# Patient Record
Sex: MALE | Race: WHITE | NOT HISPANIC OR LATINO | Employment: UNEMPLOYED | URBAN - METROPOLITAN AREA
[De-identification: names, ages, dates, MRNs, and addresses within clinical notes are randomized per-mention and may not be internally consistent; named-entity substitution may affect disease eponyms.]

---

## 2021-03-08 ENCOUNTER — HOSPITAL ENCOUNTER (EMERGENCY)
Facility: HOSPITAL | Age: 51
Discharge: HOME | End: 2021-03-08
Attending: FAMILY MEDICINE

## 2021-03-08 VITALS
HEART RATE: 110 BPM | RESPIRATION RATE: 18 BRPM | TEMPERATURE: 99 F | HEIGHT: 69 IN | SYSTOLIC BLOOD PRESSURE: 146 MMHG | BODY MASS INDEX: 26.66 KG/M2 | WEIGHT: 180 LBS | OXYGEN SATURATION: 98 % | DIASTOLIC BLOOD PRESSURE: 100 MMHG

## 2021-03-08 DIAGNOSIS — L03.90 CELLULITIS, UNSPECIFIED CELLULITIS SITE: Primary | ICD-10-CM

## 2021-03-08 PROCEDURE — 99283 EMERGENCY DEPT VISIT LOW MDM: CPT

## 2021-03-08 PROCEDURE — 25000003 PHARM REV CODE 250: Performed by: FAMILY MEDICINE

## 2021-03-08 RX ORDER — SULFAMETHOXAZOLE AND TRIMETHOPRIM 800; 160 MG/1; MG/1
1 TABLET ORAL 2 TIMES DAILY
Qty: 20 TABLET | Refills: 0 | Status: SHIPPED | OUTPATIENT
Start: 2021-03-08 | End: 2021-03-18

## 2021-03-08 RX ORDER — SULFAMETHOXAZOLE AND TRIMETHOPRIM 800; 160 MG/1; MG/1
2 TABLET ORAL 2 TIMES DAILY
Status: DISCONTINUED | OUTPATIENT
Start: 2021-03-08 | End: 2021-03-08 | Stop reason: HOSPADM

## 2021-03-08 RX ORDER — SULFAMETHOXAZOLE AND TRIMETHOPRIM 800; 160 MG/1; MG/1
1 TABLET ORAL 2 TIMES DAILY
Qty: 20 TABLET | Refills: 0 | Status: SHIPPED | OUTPATIENT
Start: 2021-03-08 | End: 2021-03-08 | Stop reason: SDUPTHER

## 2021-03-08 RX ADMIN — SULFAMETHOXAZOLE AND TRIMETHOPRIM 2 TABLET: 800; 160 TABLET ORAL at 08:03

## 2024-06-11 ENCOUNTER — HOSPITAL ENCOUNTER (EMERGENCY)
Facility: HOSPITAL | Age: 54
Discharge: LEFT BEFORE DISCHARGE | End: 2024-06-11
Payer: COMMERCIAL

## 2024-06-11 VITALS
SYSTOLIC BLOOD PRESSURE: 118 MMHG | OXYGEN SATURATION: 95 % | HEIGHT: 68 IN | HEART RATE: 99 BPM | DIASTOLIC BLOOD PRESSURE: 79 MMHG | BODY MASS INDEX: 27.58 KG/M2 | WEIGHT: 182 LBS | RESPIRATION RATE: 18 BRPM | TEMPERATURE: 98 F

## 2024-06-11 DIAGNOSIS — R06.02 SOB (SHORTNESS OF BREATH): ICD-10-CM

## 2024-06-11 LAB — POCT GLUCOSE: 100 MG/DL (ref 70–110)

## 2024-06-11 PROCEDURE — 99283 EMERGENCY DEPT VISIT LOW MDM: CPT | Mod: 25

## 2024-06-11 PROCEDURE — 82962 GLUCOSE BLOOD TEST: CPT

## 2024-06-11 NOTE — FIRST PROVIDER EVALUATION
Emergency Department TeleTriage Encounter Note      CHIEF COMPLAINT    Chief Complaint   Patient presents with    Medication Reaction     Pt got Abilify shot yesterday and began feeling dizzy and short of breath while working        VITAL SIGNS   Initial Vitals [06/11/24 1142]   BP Pulse Resp Temp SpO2   118/79 99 18 98.1 °F (36.7 °C) 95 %      MAP       --            ALLERGIES    Review of patient's allergies indicates:  No Known Allergies    PROVIDER TRIAGE NOTE  Verbal consent for the teletriage evaluation was given by the patient at the start of the evaluation.  All efforts will be made to maintain patient's privacy during the evaluation.      This is a teletriage evaluation of a 53 y.o. male presenting to the ED with c/o Dizziness and SOB this am that lasted 10 minutes; symptoms have since resolved.  Had an abilify shot 6/10/2024. Limited physical exam via telehealth: The patient is awake, alert, answering questions appropriately and is not in respiratory distress.  As the Teletriage provider, I performed an initial assessment and ordered appropriate labs and imaging studies, if any, to facilitate the patient's care once placed in the ED. Once a room is available, care and a full evaluation will be completed by an alternate ED provider.  Any additional orders and the final disposition will be determined by that provider.  All imaging and labs will not be followed-up by the Teletriage Team, including myself.      ORDERS  Labs Reviewed - No data to display    ED Orders (720h ago, onward)      Start Ordered     Status Ordering Provider    06/11/24 1204 06/11/24 1204  Pulse Oximetry Continuous  Continuous         Ordered IGNACIO PABON    06/11/24 1204 06/11/24 1204  Cardiac Monitoring - Adult  Continuous        Comments: Notify Physician If:    Ordered IGNACIO PABON    Unscheduled 06/11/24 1204  Saline lock IV  Once         Ordered IGNACIO PABON    Unscheduled 06/11/24 1204  EKG 12-lead  Once         Ordered  IGNACIO PABON    Unscheduled 06/11/24 1204  CBC auto differential  STAT         Ordered IGNACIO PABON    Unscheduled 06/11/24 1204  Comprehensive metabolic panel  STAT         Ordered IGNACIO PABON    Unscheduled 06/11/24 1204  POCT glucose  Once         Ordered IGNACIO PABON    Unscheduled 06/11/24 1204  Urinalysis, Reflex to Urine Culture Urine, Clean Catch  STAT         Ordered IGNACIO PABON    Unscheduled 06/11/24 1204  Troponin I  STAT         Ordered IGNACIO PABON    Unscheduled 06/11/24 1204  B-Type natriuretic peptide (BNP)  STAT         Ordered IGNACIO PABON    Unscheduled 06/11/24 1204  X-Ray Chest PA And Lateral  1 time imaging         Ordered IGNACIO PABON              Virtual Visit Note: The provider triage portion of this emergency department evaluation and documentation was performed via [employer], a HIPAA-compliant telemedicine application, in concert with a tele-presenter in the room. A face to face patient evaluation with one of my colleagues will occur once the patient is placed in an emergency department room.      DISCLAIMER: This note was prepared with [employer] voice recognition transcription software. Garbled syntax, mangled pronouns, and other bizarre constructions may be attributed to that software system.

## 2024-08-06 ENCOUNTER — OCCUPATIONAL HEALTH (OUTPATIENT)
Dept: URGENT CARE | Facility: CLINIC | Age: 54
End: 2024-08-06

## 2024-08-06 DIAGNOSIS — Z02.83 ENCOUNTER FOR DRUG SCREENING: ICD-10-CM

## 2024-08-06 DIAGNOSIS — Z00.00 ENCOUNTER FOR PHYSICAL EXAMINATION: Primary | ICD-10-CM

## 2024-08-06 LAB
CTP QC/QA: YES
POC 10 PANEL DRUG SCREEN: ABNORMAL

## 2024-08-06 RX ORDER — LISINOPRIL 20 MG/1
20 TABLET ORAL
COMMUNITY
Start: 2024-07-29

## 2025-05-26 ENCOUNTER — TELEPHONE (OUTPATIENT)
Dept: INTERNAL MEDICINE | Facility: CLINIC | Age: 55
End: 2025-05-26
Payer: COMMERCIAL

## 2025-05-26 ENCOUNTER — OFFICE VISIT (OUTPATIENT)
Dept: INTERNAL MEDICINE | Facility: CLINIC | Age: 55
End: 2025-05-26
Payer: COMMERCIAL

## 2025-05-26 VITALS
BODY MASS INDEX: 27.9 KG/M2 | WEIGHT: 184.06 LBS | HEART RATE: 66 BPM | OXYGEN SATURATION: 96 % | DIASTOLIC BLOOD PRESSURE: 96 MMHG | HEIGHT: 68 IN | SYSTOLIC BLOOD PRESSURE: 122 MMHG

## 2025-05-26 DIAGNOSIS — R20.0 NUMBNESS AND TINGLING OF RIGHT LEG: ICD-10-CM

## 2025-05-26 DIAGNOSIS — Z87.891 HISTORY OF TOBACCO USE: ICD-10-CM

## 2025-05-26 DIAGNOSIS — Z00.00 ANNUAL PHYSICAL EXAM: Primary | ICD-10-CM

## 2025-05-26 DIAGNOSIS — R42 DIZZINESS: ICD-10-CM

## 2025-05-26 DIAGNOSIS — I10 UNCONTROLLED HYPERTENSION: ICD-10-CM

## 2025-05-26 DIAGNOSIS — L81.9 DISCOLORATION OF SKIN OF TOE: ICD-10-CM

## 2025-05-26 DIAGNOSIS — Z72.0 CURRENT OCCASIONAL SMOKER: ICD-10-CM

## 2025-05-26 DIAGNOSIS — M79.604 RIGHT LEG PAIN: ICD-10-CM

## 2025-05-26 DIAGNOSIS — R20.2 NUMBNESS AND TINGLING OF RIGHT LEG: ICD-10-CM

## 2025-05-26 DIAGNOSIS — R29.2 HYPERREFLEXIA: ICD-10-CM

## 2025-05-26 PROCEDURE — 99999 PR PBB SHADOW E&M-EST. PATIENT-LVL IV: CPT | Mod: PBBFAC,,, | Performed by: PHYSICIAN ASSISTANT

## 2025-05-26 RX ORDER — TRIAMCINOLONE ACETONIDE 1 MG/G
CREAM TOPICAL 2 TIMES DAILY
Qty: 28.4 G | Refills: 0 | Status: SHIPPED | OUTPATIENT
Start: 2025-05-26 | End: 2025-05-28 | Stop reason: SDUPTHER

## 2025-05-26 NOTE — TELEPHONE ENCOUNTER
----- Message from Med Assistant Calvo sent at 5/26/2025 11:26 AM CDT -----  Who called:Ana Paula (Other)What is the request in detail:When will medication discussed be sent to [facility] pharmacy . Also creamCan the clinic reply by [facility]? NoWould the patient rather a call back or a response via [facility]? Call backBest call back number:Telephone Information:mobile [phone] Additional Information:Thank you.

## 2025-05-26 NOTE — PROGRESS NOTES
Internal Medicine Annual Exam       CHIEF COMPLAINT     The patient, Yaron Olvera, who is a 54 y.o. male presents for an annual exam.    HPI     PCP is No, Primary Doctor, patient is new to me.     Pt needs to establish care with new PCP.  Has not been seen by a primary care provider in approximately 2 years.  Was being seen at [facility].  He works in construction.  Does not exercise routinely.  Hydrates well it does not drink alcohol.  Is under a lot of stress at home.  He is a daily cigarette smoker.  But only smokes 1 or 2 cigarettes a day.    He is here today for his annual exam.   He has 3 new complaints  Discoloration in toenails, this has been present for months.  It is only on the right foot.  He reports associated coldness, numbness, tingling with no trauma or injury.  All 5 toes in the right foot are affected.  Numbness in left shoulder that he attributes is a result of cervical spine strain and injury in the remote past.  No history of surgery.  Denies any myelopathic symptoms like trouble with coins, buttons or keys.  Rash to dorsal forearm just distal to elbows - dry and scabbed. Evidence of excoriation. No induration or bleeding. Right > left.  No new contacts or exposures.  Has not been putting anything on the rash.  Reports it is very itchy.    He has HTN that has been managed with Lisinopril 10 mg for the past two years. He has been out of this medication for the past month.     Family history of HTN   Grandmother  of throat cancer  Pt smoked from age 15 - 10 years ago 1/2 ppd average.   Started back one year ago - occasionally (stress related)   No alcohol in the past 5 year    HM:   Politely declined vaccines   Declined c-scope at this time.   Wants to establish with Dr. Galeano       Past Medical History:  No past medical history on file.    No past surgical history on file.     No family history on file.     Social History[1]     Tobacco Use History[2]     Allergies as of 2025    (No  "Known Allergies)          Home Medications:  Prior to Admission medications    Medication Sig Start Date End Date Taking? Authorizing Provider   lisinopriL (PRINIVIL,ZESTRIL) 20 MG tablet Take 20 mg by mouth. 7/29/24   Provider, Historical       Review of Systems:  Review of Systems   Constitutional:  Negative for chills and fever.   HENT:  Negative for sore throat and trouble swallowing.    Eyes:  Negative for visual disturbance.   Respiratory:  Negative for cough and shortness of breath.    Cardiovascular:  Negative for chest pain.   Gastrointestinal:  Negative for abdominal pain, constipation, diarrhea, nausea and vomiting.   Genitourinary:  Negative for dysuria and flank pain.   Musculoskeletal:  Negative for back pain, neck pain and neck stiffness.   Skin:  Negative for rash.   Neurological:  Negative for dizziness, syncope, weakness and headaches.   Psychiatric/Behavioral:  Negative for confusion.        Health Maintainence:   Immunizations:  Health Maintenance         Date Due Completion Date    Hepatitis C Screening Never done ---    HIV Screening Never done ---    Pneumococcal Vaccines (Age 50+) (1 of 2 - PCV) Never done ---    Hemoglobin A1c (Diabetic Prevention Screening) Never done ---    Colorectal Cancer Screening Never done ---    Shingles Vaccine (1 of 2) Never done ---    COVID-19 Vaccine (1 - 2024-25 season) Never done ---    Influenza Vaccine (Season Ended) 09/01/2025 ---    Lipid Panel 12/19/2027 12/19/2022    TETANUS VACCINE 10/05/2033 10/5/2023    RSV Vaccine (Age 60+ and Pregnant patients) (1 - 1-dose 75+ series) 06/29/2045 ---             PHYSICAL EXAM     BP (!) 122/96   Pulse 66   Ht 5' 8" (1.727 m)   Wt 83.5 kg (184 lb 1.4 oz)   SpO2 96%   BMI 27.99 kg/m²  Body mass index is 27.99 kg/m².    Physical Exam  Vitals and nursing note reviewed.   Constitutional:       Appearance: Normal appearance.      Comments: Healthy appearing male in NAD or apparent pain. He makes good eye contact, " "speaks in clear full sentences and ambulates with ease.        HENT:      Head: Normocephalic and atraumatic.      Nose: Nose normal.      Mouth/Throat:      Pharynx: Oropharynx is clear.   Eyes:      Conjunctiva/sclera: Conjunctivae normal.   Cardiovascular:      Rate and Rhythm: Normal rate and regular rhythm.      Pulses: Normal pulses.   Pulmonary:      Effort: No respiratory distress.   Abdominal:      Tenderness: There is no abdominal tenderness.   Musculoskeletal:         General: Normal range of motion.      Cervical back: No rigidity.   Skin:     General: Skin is warm and dry.      Capillary Refill: Capillary refill takes less than 2 seconds.      Findings: No rash.      Comments: Toenails of all 5 toes on the right foot are dark, discolored, thick, brittle appearing and deformed.  Normal distal cap refill  Skin warm to touch  Normal DP and PT pulse on both lower extremities    The right calf is visibly smaller than the left because of muscle wasting.   Negative Homans sign  Range of motion of knee and ankle bilaterally     Neurological:      General: No focal deficit present.      Mental Status: He is alert.      Gait: Gait normal.   Psychiatric:         Mood and Affect: Mood normal.         LABS     No results found for: "LABA1C", "HGBA1C"  CMP  No results found for: "NA", "K", "CL", "CO2", "GLU", "BUN", "CREATININE", "CALCIUM", "PROT", "ALBUMIN", "BILITOT", "ALKPHOS", "AST", "ALT", "ANIONGAP", "ESTGFRAFRICA", "EGFRNONAA"  No results found for: "WBC", "HGB", "HCT", "MCV", "PLT"  Lab Results   Component Value Date    CHOL 140 12/19/2022     Lab Results   Component Value Date    HDL 41 12/19/2022     Lab Results   Component Value Date    LDLCALC 78 12/19/2022     Lab Results   Component Value Date    TRIG 106 12/19/2022     No results found for: "CHOLHDL"  No results found for: "TSH", "G2ECREY", "F2TYKZR", "THYROIDAB"    ASSESSMENT/PLAN     Yaron Olvera is a 54 y.o. male    Yaron was seen today for " establish care, shoulder injury, rash and nail problem.  I am concerned that this nail problem could be related to a vascular issue even though patient has good DP and PT pulses.  He has visibly smaller lower right leg than the left.  Patient reports that he has had muscle weakness in the calf for the past 2 years.  Is unclear of why this is present denies any trauma or injury.  Will refer to Neurology for possible nerve conduction testing.  Will also get ABIs and cardiovascular stress test.  Routine labs pending as well.  Will refer to Dermatology for toenail evaluation.  He will need to establish with primary care provider.    We talked about health maintenance screening including colonoscopy but he is unclear if he wants to schedule C scope or if he wants to do Cologuard testing.  He has no family history of colon cancer.    He is not interested in vaccines today.      Diagnoses and all orders for this visit:    Annual physical exam  -     Comprehensive Metabolic Panel; Future  -     CBC Auto Differential; Future  -     Hemoglobin A1C; Future  -     Lipid Panel; Future  -     TSH; Future  -     Hepatitis C Antibody; Future  -     HIV 1/2 Ag/Ab (4th Gen); Future    Dizziness  -     Exercise Stress - EKG; Future  -     SCHEDULED EKG 12-LEAD (to Muse); Future  -     Ambulatory referral/consult to Cardiology; Future    Numbness and tingling of right leg   -unclear if this has related to peripheral neuropathy.   -differential does include demyelinating disease   -concerned that discoloration and toenails could potentially be vascular though patient is not describing any claudication and has good distal palpable pulses.    Discoloration of skin of toe  -     Ambulatory referral/consult to Dermatology; Future    Hyperreflexia  Comments:  right patella DTR  -recommend referral to neurology     Uncontrolled hypertension  -     Ambulatory referral/consult to Cardiology; Future    Current occasional smoker   -not currently  interested in smoking cessation   -counseled on importance of smoking cessation   -he has 29 pack-year history from 1985 to 2014   -he has 1.4 pack-year history from 2024 to current.   -he does qualify for low-dose CT chest     History of tobacco use   -not currently interested in smoking cessation   -counseled on importance of smoking cessation   -he has 29 pack-year history from 1985 to 2014   -he has 1.4 pack-year history from 2024 to current.   -he does qualify for low-dose CT chest     Right leg pain  -     CK; Future  -     Ankle Brachial Indices (LIU); Future      Marilee Lange PA-C  Department of Internal Medicine - [facility]   8:21 AM          [1]   Social History  Socioeconomic History    Marital status: Single   Tobacco Use    Smoking status: Every Day     Types: Vaping with nicotine    Smokeless tobacco: Never   Substance and Sexual Activity    Alcohol use: Never    Drug use: Never    Sexual activity: Yes   [2]   Social History  Tobacco Use   Smoking Status Every Day    Types: Vaping with nicotine   Smokeless Tobacco Never

## 2025-05-27 ENCOUNTER — LAB VISIT (OUTPATIENT)
Dept: LAB | Facility: HOSPITAL | Age: 55
End: 2025-05-27
Attending: PHYSICIAN ASSISTANT
Payer: COMMERCIAL

## 2025-05-27 DIAGNOSIS — Z00.00 ANNUAL PHYSICAL EXAM: ICD-10-CM

## 2025-05-27 DIAGNOSIS — M79.604 RIGHT LEG PAIN: ICD-10-CM

## 2025-05-27 LAB
ABSOLUTE EOSINOPHIL (OHS): 0.4 K/UL
ABSOLUTE MONOCYTE (OHS): 0.85 K/UL (ref 0.3–1)
ABSOLUTE NEUTROPHIL COUNT (OHS): 6.39 K/UL (ref 1.8–7.7)
ALBUMIN SERPL BCP-MCNC: 4.1 G/DL (ref 3.5–5.2)
ALP SERPL-CCNC: 76 UNIT/L (ref 40–150)
ALT SERPL W/O P-5'-P-CCNC: 27 UNIT/L (ref 10–44)
ANION GAP (OHS): 13 MMOL/L (ref 8–16)
AST SERPL-CCNC: 36 UNIT/L (ref 11–45)
BASOPHILS # BLD AUTO: 0.06 K/UL
BASOPHILS NFR BLD AUTO: 0.6 %
BILIRUB SERPL-MCNC: 0.5 MG/DL (ref 0.1–1)
BUN SERPL-MCNC: 9 MG/DL (ref 6–20)
CALCIUM SERPL-MCNC: 9.2 MG/DL (ref 8.7–10.5)
CHLORIDE SERPL-SCNC: 105 MMOL/L (ref 95–110)
CHOLEST SERPL-MCNC: 176 MG/DL (ref 120–199)
CHOLEST/HDLC SERPL: 4.3 {RATIO} (ref 2–5)
CK SERPL-CCNC: 531 U/L (ref 20–200)
CO2 SERPL-SCNC: 23 MMOL/L (ref 23–29)
CREAT SERPL-MCNC: 1 MG/DL (ref 0.5–1.4)
EAG (OHS): 97 MG/DL (ref 68–131)
ERYTHROCYTE [DISTWIDTH] IN BLOOD BY AUTOMATED COUNT: 12.6 % (ref 11.5–14.5)
GFR SERPLBLD CREATININE-BSD FMLA CKD-EPI: >60 ML/MIN/1.73/M2
GLUCOSE SERPL-MCNC: 78 MG/DL (ref 70–110)
HBA1C MFR BLD: 5 % (ref 4–5.6)
HCT VFR BLD AUTO: 47.2 % (ref 40–54)
HCV AB SERPL QL IA: NORMAL
HDLC SERPL-MCNC: 41 MG/DL (ref 40–75)
HDLC SERPL: 23.3 % (ref 20–50)
HGB BLD-MCNC: 16.1 GM/DL (ref 14–18)
HIV 1+2 AB+HIV1 P24 AG SERPL QL IA: NORMAL
IMM GRANULOCYTES # BLD AUTO: 0.02 K/UL (ref 0–0.04)
IMM GRANULOCYTES NFR BLD AUTO: 0.2 % (ref 0–0.5)
LDLC SERPL CALC-MCNC: 104.2 MG/DL (ref 63–159)
LYMPHOCYTES # BLD AUTO: 2.04 K/UL (ref 1–4.8)
MCH RBC QN AUTO: 30.7 PG (ref 27–31)
MCHC RBC AUTO-ENTMCNC: 34.1 G/DL (ref 32–36)
MCV RBC AUTO: 90 FL (ref 82–98)
NONHDLC SERPL-MCNC: 135 MG/DL
NUCLEATED RBC (/100WBC) (OHS): 0 /100 WBC
PLATELET # BLD AUTO: 260 K/UL (ref 150–450)
PMV BLD AUTO: 9.7 FL (ref 9.2–12.9)
POTASSIUM SERPL-SCNC: 3.7 MMOL/L (ref 3.5–5.1)
PROT SERPL-MCNC: 7.2 GM/DL (ref 6–8.4)
RBC # BLD AUTO: 5.25 M/UL (ref 4.6–6.2)
RELATIVE EOSINOPHIL (OHS): 4.1 %
RELATIVE LYMPHOCYTE (OHS): 20.9 % (ref 18–48)
RELATIVE MONOCYTE (OHS): 8.7 % (ref 4–15)
RELATIVE NEUTROPHIL (OHS): 65.5 % (ref 38–73)
SODIUM SERPL-SCNC: 141 MMOL/L (ref 136–145)
TRIGL SERPL-MCNC: 154 MG/DL (ref 30–150)
TSH SERPL-ACNC: 1.27 UIU/ML (ref 0.4–4)
WBC # BLD AUTO: 9.76 K/UL (ref 3.9–12.7)

## 2025-05-27 PROCEDURE — 82465 ASSAY BLD/SERUM CHOLESTEROL: CPT

## 2025-05-27 PROCEDURE — 36415 COLL VENOUS BLD VENIPUNCTURE: CPT | Mod: PO

## 2025-05-27 PROCEDURE — 84132 ASSAY OF SERUM POTASSIUM: CPT

## 2025-05-27 PROCEDURE — 83036 HEMOGLOBIN GLYCOSYLATED A1C: CPT

## 2025-05-27 PROCEDURE — 84443 ASSAY THYROID STIM HORMONE: CPT

## 2025-05-27 PROCEDURE — 87389 HIV-1 AG W/HIV-1&-2 AB AG IA: CPT

## 2025-05-27 PROCEDURE — 85025 COMPLETE CBC W/AUTO DIFF WBC: CPT

## 2025-05-27 PROCEDURE — 82550 ASSAY OF CK (CPK): CPT

## 2025-05-27 PROCEDURE — 86803 HEPATITIS C AB TEST: CPT

## 2025-05-28 NOTE — TELEPHONE ENCOUNTER
----- Message from Marycruz sent at 5/28/2025 10:10 AM CDT -----  Contact: [phone]  Requesting an RX refill or new RX.Is this a refill or new RX: RX name and strength (copy/paste from chart):  lisinopriL (PRINIVIL,ZESTRIL) 20 MG tabletIs this a 30 day or 90 day RX: [facility]  [address] [place] [address]Jycij: [phone] Fax: [phone]   Requesting an RX refill or new RX.Is this a refill or new RX: (this was sent to the wrong pharmacy)RX name and strength (copy/paste from chart):  triamcinolone acetonide 0.1% (KENALOG) 0.1 % creamIs this a 30 day or 90 day RX: Pharmacy name and phone # (copy/paste from chart):  [facility] - [address] [place] [place] [address]Ehznj: [phone] Fax: [phone] The doctors have asked that we provide their patients with the following 2 reminders -- prescription refills can take up to 72 hours, and a friendly reminder that in the future you can use your [facility] account to request refills

## 2025-05-30 ENCOUNTER — TELEPHONE (OUTPATIENT)
Dept: INTERNAL MEDICINE | Facility: CLINIC | Age: 55
End: 2025-05-30
Payer: COMMERCIAL

## 2025-05-30 RX ORDER — LISINOPRIL 20 MG/1
20 TABLET ORAL DAILY
Qty: 90 TABLET | Refills: 3 | Status: SHIPPED | OUTPATIENT
Start: 2025-05-30 | End: 2026-05-30

## 2025-05-30 RX ORDER — TRIAMCINOLONE ACETONIDE 1 MG/G
CREAM TOPICAL 2 TIMES DAILY
Qty: 28.4 G | Refills: 0 | Status: SHIPPED | OUTPATIENT
Start: 2025-05-30

## 2025-05-30 NOTE — TELEPHONE ENCOUNTER
----- Message from Marycruz sent at 5/30/2025  9:07 AM CDT -----  Contact: [phone]  or [phone]  .[email or web address] Patient is calling for Medical Advice regarding:pt wife Jessica is calling she is upset about her  meds and wants a callback today.  It was sent to the wrong pharmacy and the other pharmacy do not have it yet.  Wife is very rude, ugly, and upset.  Please call pt back and advise.How long has patient had these symptoms:Pharmacy name and phone#:Patient wants a call back or thru [facility]:callbackComments:Please advise patient replies from provider may take up to 48 hours.

## 2025-05-30 NOTE — TELEPHONE ENCOUNTER
10:16 AM called and spoke to patient. Refills went to [facility] today - this is the appropriate pharmacy. I also discussed pt's lab results and specialist follow-up plan. He is aware of and amenable to plan. ROCKY DON

## 2025-06-19 ENCOUNTER — TELEPHONE (OUTPATIENT)
Dept: INTERNAL MEDICINE | Facility: CLINIC | Age: 55
End: 2025-06-19
Payer: COMMERCIAL

## 2025-06-19 NOTE — TELEPHONE ENCOUNTER
Tried to reach patient 3 times and all 3 were unsuccessful. I was able to reach second contact Ana Paula, and she stated the patient is going to be unreachable due to his work schedule. She also stated she spoke with someone on yesterday and explained that they will need financial assistance to proceed with treatment, and she can not come up here anytime soon. Please advise.

## 2025-07-17 ENCOUNTER — PATIENT MESSAGE (OUTPATIENT)
Dept: DERMATOLOGY | Facility: CLINIC | Age: 55
End: 2025-07-17
Payer: COMMERCIAL

## 2025-08-22 ENCOUNTER — TELEPHONE (OUTPATIENT)
Dept: DERMATOLOGY | Facility: CLINIC | Age: 55
End: 2025-08-22
Payer: COMMERCIAL